# Patient Record
Sex: FEMALE | Race: WHITE | NOT HISPANIC OR LATINO | Employment: OTHER | URBAN - NONMETROPOLITAN AREA
[De-identification: names, ages, dates, MRNs, and addresses within clinical notes are randomized per-mention and may not be internally consistent; named-entity substitution may affect disease eponyms.]

---

## 2021-04-22 ENCOUNTER — TRANSFERRED RECORDS (OUTPATIENT)
Dept: HEALTH INFORMATION MANAGEMENT | Facility: HOSPITAL | Age: 61
End: 2021-04-22

## 2021-05-11 ENCOUNTER — TRANSFERRED RECORDS (OUTPATIENT)
Dept: HEALTH INFORMATION MANAGEMENT | Facility: HOSPITAL | Age: 61
End: 2021-05-11

## 2021-05-19 ENCOUNTER — ANESTHESIA EVENT (OUTPATIENT)
Dept: SURGERY | Facility: HOSPITAL | Age: 61
End: 2021-05-19
Payer: COMMERCIAL

## 2021-05-20 RX ORDER — TRAZODONE HYDROCHLORIDE 100 MG/1
100 TABLET ORAL AT BEDTIME
COMMUNITY

## 2021-05-20 RX ORDER — DOCUSATE SODIUM 100 MG/1
100 CAPSULE, LIQUID FILLED ORAL 2 TIMES DAILY
COMMUNITY

## 2021-05-20 NOTE — ANESTHESIA PREPROCEDURE EVALUATION
Anesthesia Pre-Procedure Evaluation    Patient: Mini Aguilar   MRN: [id number] : 1960        Preoperative Diagnosis: Osteoarthritis of right knee [M17.11]   Procedure : Procedure(s):  RIGHT KNEE TOTAL REPLACEMENT     No past medical history on file.   Past Surgical History:   Procedure Laterality Date     bladder repair       Diabetes       GASTRIC BYPASS       vaginal hysterectomy      arthritis      No Known Allergies   Social History     Tobacco Use     Smoking status: Never Smoker   Substance Use Topics     Alcohol use: Yes     Comment: rarely      Wt Readings from Last 1 Encounters:   No data found for Wt        Anesthesia Evaluation   Pt has had prior anesthetic. Type: General and MAC.    No history of anesthetic complications       ROS/MED HX  ENT/Pulmonary:     (+) allergic rhinitis,     Neurologic: Comment: Insomnia      Cardiovascular:  - neg cardiovascular ROS   (+) -----Previous cardiac testing   Echo: Date: Results:    Stress Test: Date: Results:    ECG Reviewed: Date: 21 Results:  Nsr rate 65  Cath: Date: Results:      METS/Exercise Tolerance: >4 METS    Hematologic:  - neg hematologic  ROS     Musculoskeletal:   (+) arthritis,     GI/Hepatic: Comment: S/p Gastric bypass      Renal/Genitourinary: Comment: Bladder repair      Endo: Comment: Pre diabetes       Psychiatric/Substance Use:  - neg psychiatric ROS     Infectious Disease: Comment: C diff  history      Malignancy:  - neg malignancy ROS     Other:  - neg other ROS          Physical Exam    Airway        Mallampati: I   TM distance: > 3 FB   Neck ROM: full   Mouth opening: > 3 cm    Respiratory Devices and Support         Dental  no notable dental history         Cardiovascular   cardiovascular exam normal       Rhythm and rate: regular and normal     Pulmonary   pulmonary exam normal        breath sounds clear to auscultation           OUTSIDE LABS:  CBC: No results found for: WBC, HGB, HCT, PLT  BMP: No results found  for: NA, POTASSIUM, CHLORIDE, CO2, BUN, CR, GLC  COAGS: No results found for: PTT, INR, FIBR  POC: No results found for: BGM, HCG, HCGS  HEPATIC: No results found for: ALBUMIN, PROTTOTAL, ALT, AST, GGT, ALKPHOS, BILITOTAL, BILIDIRECT, ALEXSANDER  OTHER: No results found for: PH, LACT, A1C, AMANDA, PHOS, MAG, LIPASE, AMYLASE, TSH, T4, T3, CRP, SED    Anesthesia Plan    ASA Status:  2   NPO Status:  NPO Appropriate    Anesthesia Type: Spinal.   Induction: N/a.           Consents    Anesthesia Plan(s) and associated risks, benefits, and realistic alternatives discussed. Questions answered and patient/representative(s) expressed understanding.     - Discussed with:  Patient         Postoperative Care    Pain management: Neuraxial analgesia, Peripheral nerve block (Single Shot).        Comments:     5/11/21  Discussed risks and benefits of spinal anesthetic with patient including itching, sore back, infection, hematoma, spinal headache, CV complications, nerve damage, inability to place, conversion to general anesthesia, loss of airway, and death. Discussed risks and benefits of adductor canal and iPACK on right leg. Pt wishes to proceed.             RAFAEL Rubin CRNA

## 2021-05-24 ENCOUNTER — EXTERNAL ORDER RESULTS (OUTPATIENT)
Dept: TRANSPLANT | Facility: CLINIC | Age: 61
End: 2021-05-24

## 2021-05-26 RX ORDER — MULTIVITAMIN WITH IRON
1 TABLET ORAL DAILY
COMMUNITY

## 2021-05-26 RX ORDER — FOLIC ACID 0.8 MG
500 TABLET ORAL AT BEDTIME
COMMUNITY

## 2021-05-27 ENCOUNTER — APPOINTMENT (OUTPATIENT)
Dept: GENERAL RADIOLOGY | Facility: HOSPITAL | Age: 61
End: 2021-05-27
Attending: ORTHOPAEDIC SURGERY
Payer: COMMERCIAL

## 2021-05-27 ENCOUNTER — ANESTHESIA (OUTPATIENT)
Dept: SURGERY | Facility: HOSPITAL | Age: 61
End: 2021-05-27
Payer: COMMERCIAL

## 2021-05-27 ENCOUNTER — HOSPITAL ENCOUNTER (INPATIENT)
Facility: HOSPITAL | Age: 61
LOS: 1 days | Discharge: HOME | End: 2021-05-29
Attending: ORTHOPAEDIC SURGERY | Admitting: INTERNAL MEDICINE
Payer: COMMERCIAL

## 2021-05-27 DIAGNOSIS — M25.561 CHRONIC PAIN OF RIGHT KNEE: Primary | ICD-10-CM

## 2021-05-27 DIAGNOSIS — G89.29 CHRONIC PAIN OF RIGHT KNEE: Primary | ICD-10-CM

## 2021-05-27 PROCEDURE — 710N000010 HC RECOVERY PHASE 1, LEVEL 2, PER MIN: Performed by: ORTHOPAEDIC SURGERY

## 2021-05-27 PROCEDURE — 64447 NJX AA&/STRD FEMORAL NRV IMG: CPT | Mod: XU | Performed by: NURSE ANESTHETIST, CERTIFIED REGISTERED

## 2021-05-27 PROCEDURE — 88311 DECALCIFY TISSUE: CPT | Mod: TC | Performed by: ORTHOPAEDIC SURGERY

## 2021-05-27 PROCEDURE — 999N000157 HC STATISTIC RCP TIME EA 10 MIN

## 2021-05-27 PROCEDURE — 999N000141 HC STATISTIC PRE-PROCEDURE NURSING ASSESSMENT: Performed by: ORTHOPAEDIC SURGERY

## 2021-05-27 PROCEDURE — 250N000011 HC RX IP 250 OP 636: Performed by: NURSE ANESTHETIST, CERTIFIED REGISTERED

## 2021-05-27 PROCEDURE — 250N000011 HC RX IP 250 OP 636: Performed by: ORTHOPAEDIC SURGERY

## 2021-05-27 PROCEDURE — 76942 ECHO GUIDE FOR BIOPSY: CPT | Mod: 26 | Performed by: NURSE ANESTHETIST, CERTIFIED REGISTERED

## 2021-05-27 PROCEDURE — 258N000003 HC RX IP 258 OP 636: Performed by: NURSE ANESTHETIST, CERTIFIED REGISTERED

## 2021-05-27 PROCEDURE — C1776 JOINT DEVICE (IMPLANTABLE): HCPCS | Performed by: ORTHOPAEDIC SURGERY

## 2021-05-27 PROCEDURE — 88300 SURGICAL PATH GROSS: CPT | Mod: TC | Performed by: ORTHOPAEDIC SURGERY

## 2021-05-27 PROCEDURE — 272N000001 HC OR GENERAL SUPPLY STERILE: Performed by: ORTHOPAEDIC SURGERY

## 2021-05-27 PROCEDURE — 3E0T3BZ INTRODUCTION OF ANESTHETIC AGENT INTO PERIPHERAL NERVES AND PLEXI, PERCUTANEOUS APPROACH: ICD-10-PCS | Performed by: NURSE ANESTHETIST, CERTIFIED REGISTERED

## 2021-05-27 PROCEDURE — 370N000017 HC ANESTHESIA TECHNICAL FEE, PER MIN: Performed by: ORTHOPAEDIC SURGERY

## 2021-05-27 PROCEDURE — 27447 TOTAL KNEE ARTHROPLASTY: CPT | Performed by: NURSE ANESTHETIST, CERTIFIED REGISTERED

## 2021-05-27 PROCEDURE — 250N000013 HC RX MED GY IP 250 OP 250 PS 637: Performed by: ORTHOPAEDIC SURGERY

## 2021-05-27 PROCEDURE — C9290 INJ, BUPIVACAINE LIPOSOME: HCPCS | Performed by: ORTHOPAEDIC SURGERY

## 2021-05-27 PROCEDURE — 250N000009 HC RX 250: Performed by: ORTHOPAEDIC SURGERY

## 2021-05-27 PROCEDURE — 999N000065 XR KNEE PORT RIGHT 1/2 VIEWS: Mod: RT

## 2021-05-27 PROCEDURE — 250N000009 HC RX 250: Performed by: NURSE ANESTHETIST, CERTIFIED REGISTERED

## 2021-05-27 PROCEDURE — 0SRC0J9 REPLACEMENT OF RIGHT KNEE JOINT WITH SYNTHETIC SUBSTITUTE, CEMENTED, OPEN APPROACH: ICD-10-PCS | Performed by: ORTHOPAEDIC SURGERY

## 2021-05-27 PROCEDURE — 360N000077 HC SURGERY LEVEL 4, PER MIN: Performed by: ORTHOPAEDIC SURGERY

## 2021-05-27 PROCEDURE — C1713 ANCHOR/SCREW BN/BN,TIS/BN: HCPCS | Performed by: ORTHOPAEDIC SURGERY

## 2021-05-27 DEVICE — SCREW-FEMALE HEX 2.5MM/25MM LENGTH: Type: IMPLANTABLE DEVICE | Site: KNEE | Status: FUNCTIONAL

## 2021-05-27 DEVICE — IMPLANTABLE DEVICE: Type: IMPLANTABLE DEVICE | Site: KNEE | Status: FUNCTIONAL

## 2021-05-27 DEVICE — BONE CEMENT-SIMPLEX: Type: IMPLANTABLE DEVICE | Site: KNEE | Status: FUNCTIONAL

## 2021-05-27 RX ORDER — BISACODYL 10 MG
10 SUPPOSITORY, RECTAL RECTAL DAILY PRN
Status: DISCONTINUED | OUTPATIENT
Start: 2021-05-27 | End: 2021-05-29 | Stop reason: HOSPADM

## 2021-05-27 RX ORDER — NALOXONE HYDROCHLORIDE 0.4 MG/ML
0.2 INJECTION, SOLUTION INTRAMUSCULAR; INTRAVENOUS; SUBCUTANEOUS
Status: DISCONTINUED | OUTPATIENT
Start: 2021-05-27 | End: 2021-05-29 | Stop reason: HOSPADM

## 2021-05-27 RX ORDER — MULTIVITAMIN WITH IRON
1 TABLET ORAL DAILY
Status: DISCONTINUED | OUTPATIENT
Start: 2021-05-27 | End: 2021-05-27 | Stop reason: RX

## 2021-05-27 RX ORDER — ROPIVACAINE HYDROCHLORIDE 5 MG/ML
INJECTION, SOLUTION EPIDURAL; INFILTRATION; PERINEURAL
Status: DISCONTINUED | OUTPATIENT
Start: 2021-05-27 | End: 2021-05-27

## 2021-05-27 RX ORDER — HYDROXYZINE HYDROCHLORIDE 25 MG/1
25 TABLET, FILM COATED ORAL EVERY 6 HOURS PRN
Status: DISCONTINUED | OUTPATIENT
Start: 2021-05-27 | End: 2021-05-29 | Stop reason: HOSPADM

## 2021-05-27 RX ORDER — NALOXONE HYDROCHLORIDE 0.4 MG/ML
0.2 INJECTION, SOLUTION INTRAMUSCULAR; INTRAVENOUS; SUBCUTANEOUS
Status: DISCONTINUED | OUTPATIENT
Start: 2021-05-27 | End: 2021-05-27

## 2021-05-27 RX ORDER — AMOXICILLIN 250 MG
1 CAPSULE ORAL 2 TIMES DAILY
Status: DISCONTINUED | OUTPATIENT
Start: 2021-05-27 | End: 2021-05-29 | Stop reason: HOSPADM

## 2021-05-27 RX ORDER — SODIUM CHLORIDE, SODIUM LACTATE, POTASSIUM CHLORIDE, CALCIUM CHLORIDE 600; 310; 30; 20 MG/100ML; MG/100ML; MG/100ML; MG/100ML
INJECTION, SOLUTION INTRAVENOUS CONTINUOUS
Status: DISCONTINUED | OUTPATIENT
Start: 2021-05-27 | End: 2021-05-29 | Stop reason: HOSPADM

## 2021-05-27 RX ORDER — OXYCODONE HYDROCHLORIDE 5 MG/1
15 TABLET ORAL EVERY 4 HOURS PRN
Status: DISCONTINUED | OUTPATIENT
Start: 2021-05-27 | End: 2021-05-29 | Stop reason: HOSPADM

## 2021-05-27 RX ORDER — POLYETHYLENE GLYCOL 3350 17 G/17G
17 POWDER, FOR SOLUTION ORAL DAILY
Status: DISCONTINUED | OUTPATIENT
Start: 2021-05-28 | End: 2021-05-29 | Stop reason: HOSPADM

## 2021-05-27 RX ORDER — FENTANYL CITRATE 50 UG/ML
INJECTION, SOLUTION INTRAMUSCULAR; INTRAVENOUS PRN
Status: DISCONTINUED | OUTPATIENT
Start: 2021-05-27 | End: 2021-05-27

## 2021-05-27 RX ORDER — HYDROMORPHONE HCL IN WATER/PF 6 MG/30 ML
0.2 PATIENT CONTROLLED ANALGESIA SYRINGE INTRAVENOUS
Status: DISCONTINUED | OUTPATIENT
Start: 2021-05-27 | End: 2021-05-29 | Stop reason: HOSPADM

## 2021-05-27 RX ORDER — ACETAMINOPHEN 325 MG/1
975 TABLET ORAL ONCE
Status: COMPLETED | OUTPATIENT
Start: 2021-05-27 | End: 2021-05-27

## 2021-05-27 RX ORDER — NALOXONE HYDROCHLORIDE 0.4 MG/ML
0.4 INJECTION, SOLUTION INTRAMUSCULAR; INTRAVENOUS; SUBCUTANEOUS
Status: DISCONTINUED | OUTPATIENT
Start: 2021-05-27 | End: 2021-05-27

## 2021-05-27 RX ORDER — ROPIVACAINE HYDROCHLORIDE 5 MG/ML
INJECTION, SOLUTION EPIDURAL; INFILTRATION; PERINEURAL PRN
Status: DISCONTINUED | OUTPATIENT
Start: 2021-05-27 | End: 2021-05-27

## 2021-05-27 RX ORDER — FENTANYL CITRATE 50 UG/ML
25-50 INJECTION, SOLUTION INTRAMUSCULAR; INTRAVENOUS
Status: DISCONTINUED | OUTPATIENT
Start: 2021-05-27 | End: 2021-05-27 | Stop reason: HOSPADM

## 2021-05-27 RX ORDER — DEXAMETHASONE SODIUM PHOSPHATE 10 MG/ML
INJECTION, SOLUTION INTRAMUSCULAR; INTRAVENOUS PRN
Status: DISCONTINUED | OUTPATIENT
Start: 2021-05-27 | End: 2021-05-27

## 2021-05-27 RX ORDER — TRANEXAMIC ACID 10 MG/ML
1 INJECTION, SOLUTION INTRAVENOUS ONCE
Status: DISCONTINUED | OUTPATIENT
Start: 2021-05-27 | End: 2021-05-27 | Stop reason: HOSPADM

## 2021-05-27 RX ORDER — FOLIC ACID 0.8 MG
500 TABLET ORAL AT BEDTIME
Status: DISCONTINUED | OUTPATIENT
Start: 2021-05-27 | End: 2021-05-27 | Stop reason: CLARIF

## 2021-05-27 RX ORDER — DOCUSATE SODIUM 100 MG/1
100 CAPSULE, LIQUID FILLED ORAL 2 TIMES DAILY
Status: DISCONTINUED | OUTPATIENT
Start: 2021-05-27 | End: 2021-05-29 | Stop reason: HOSPADM

## 2021-05-27 RX ORDER — CEFAZOLIN SODIUM 2 G/100ML
2 INJECTION, SOLUTION INTRAVENOUS
Status: COMPLETED | OUTPATIENT
Start: 2021-05-27 | End: 2021-05-27

## 2021-05-27 RX ORDER — BUPIVACAINE HYDROCHLORIDE 7.5 MG/ML
INJECTION, SOLUTION INTRASPINAL PRN
Status: DISCONTINUED | OUTPATIENT
Start: 2021-05-27 | End: 2021-05-27

## 2021-05-27 RX ORDER — OXYCODONE HYDROCHLORIDE 5 MG/1
5-10 TABLET ORAL
Qty: 30 TABLET | Refills: 0 | Status: SHIPPED | OUTPATIENT
Start: 2021-05-27

## 2021-05-27 RX ORDER — TRANEXAMIC ACID 10 MG/ML
1 INJECTION, SOLUTION INTRAVENOUS ONCE
Status: COMPLETED | OUTPATIENT
Start: 2021-05-27 | End: 2021-05-27

## 2021-05-27 RX ORDER — LIDOCAINE 40 MG/G
CREAM TOPICAL
Status: DISCONTINUED | OUTPATIENT
Start: 2021-05-27 | End: 2021-05-27 | Stop reason: HOSPADM

## 2021-05-27 RX ORDER — MULTIVITAMIN,THERAPEUTIC
1 TABLET ORAL 2 TIMES DAILY
COMMUNITY

## 2021-05-27 RX ORDER — NALOXONE HYDROCHLORIDE 0.4 MG/ML
0.4 INJECTION, SOLUTION INTRAMUSCULAR; INTRAVENOUS; SUBCUTANEOUS
Status: DISCONTINUED | OUTPATIENT
Start: 2021-05-27 | End: 2021-05-29 | Stop reason: HOSPADM

## 2021-05-27 RX ORDER — LIDOCAINE 40 MG/G
CREAM TOPICAL
Status: DISCONTINUED | OUTPATIENT
Start: 2021-05-27 | End: 2021-05-29 | Stop reason: HOSPADM

## 2021-05-27 RX ORDER — PROPOFOL 10 MG/ML
INJECTION, EMULSION INTRAVENOUS CONTINUOUS PRN
Status: DISCONTINUED | OUTPATIENT
Start: 2021-05-27 | End: 2021-05-27

## 2021-05-27 RX ORDER — VITAMIN B COMPLEX
25 TABLET ORAL DAILY
Status: DISCONTINUED | OUTPATIENT
Start: 2021-05-27 | End: 2021-05-29 | Stop reason: HOSPADM

## 2021-05-27 RX ORDER — TRAZODONE HYDROCHLORIDE 100 MG/1
100 TABLET ORAL AT BEDTIME
Status: DISCONTINUED | OUTPATIENT
Start: 2021-05-27 | End: 2021-05-29 | Stop reason: HOSPADM

## 2021-05-27 RX ORDER — DEXAMETHASONE SODIUM PHOSPHATE 10 MG/ML
INJECTION, SOLUTION INTRAMUSCULAR; INTRAVENOUS
Status: DISCONTINUED | OUTPATIENT
Start: 2021-05-27 | End: 2021-05-27

## 2021-05-27 RX ORDER — ASPIRIN 81 MG/1
81 TABLET ORAL 2 TIMES DAILY
Status: DISCONTINUED | OUTPATIENT
Start: 2021-05-27 | End: 2021-05-29 | Stop reason: HOSPADM

## 2021-05-27 RX ORDER — CEFAZOLIN SODIUM 2 G/100ML
2 INJECTION, SOLUTION INTRAVENOUS EVERY 8 HOURS
Status: COMPLETED | OUTPATIENT
Start: 2021-05-27 | End: 2021-05-28

## 2021-05-27 RX ORDER — BUPIVACAINE HYDROCHLORIDE 2.5 MG/ML
INJECTION, SOLUTION INFILTRATION; PERINEURAL PRN
Status: DISCONTINUED | OUTPATIENT
Start: 2021-05-27 | End: 2021-05-27 | Stop reason: HOSPADM

## 2021-05-27 RX ORDER — ONDANSETRON 4 MG/1
4 TABLET, ORALLY DISINTEGRATING ORAL EVERY 6 HOURS PRN
Status: DISCONTINUED | OUTPATIENT
Start: 2021-05-27 | End: 2021-05-29 | Stop reason: HOSPADM

## 2021-05-27 RX ORDER — CEFAZOLIN SODIUM 2 G/100ML
2 INJECTION, SOLUTION INTRAVENOUS SEE ADMIN INSTRUCTIONS
Status: DISCONTINUED | OUTPATIENT
Start: 2021-05-27 | End: 2021-05-27 | Stop reason: HOSPADM

## 2021-05-27 RX ORDER — HYDROMORPHONE HYDROCHLORIDE 1 MG/ML
0.4 INJECTION, SOLUTION INTRAMUSCULAR; INTRAVENOUS; SUBCUTANEOUS
Status: DISCONTINUED | OUTPATIENT
Start: 2021-05-27 | End: 2021-05-29 | Stop reason: HOSPADM

## 2021-05-27 RX ORDER — OXYCODONE HYDROCHLORIDE 5 MG/1
10 TABLET ORAL EVERY 4 HOURS PRN
Status: DISCONTINUED | OUTPATIENT
Start: 2021-05-27 | End: 2021-05-29 | Stop reason: HOSPADM

## 2021-05-27 RX ORDER — PROCHLORPERAZINE MALEATE 5 MG
10 TABLET ORAL EVERY 6 HOURS PRN
Status: DISCONTINUED | OUTPATIENT
Start: 2021-05-27 | End: 2021-05-29 | Stop reason: HOSPADM

## 2021-05-27 RX ORDER — SODIUM CHLORIDE, SODIUM LACTATE, POTASSIUM CHLORIDE, CALCIUM CHLORIDE 600; 310; 30; 20 MG/100ML; MG/100ML; MG/100ML; MG/100ML
INJECTION, SOLUTION INTRAVENOUS CONTINUOUS
Status: DISCONTINUED | OUTPATIENT
Start: 2021-05-27 | End: 2021-05-27 | Stop reason: HOSPADM

## 2021-05-27 RX ORDER — ONDANSETRON 4 MG/1
4 TABLET, ORALLY DISINTEGRATING ORAL EVERY 30 MIN PRN
Status: DISCONTINUED | OUTPATIENT
Start: 2021-05-27 | End: 2021-05-27 | Stop reason: HOSPADM

## 2021-05-27 RX ORDER — ONDANSETRON 2 MG/ML
4 INJECTION INTRAMUSCULAR; INTRAVENOUS EVERY 30 MIN PRN
Status: DISCONTINUED | OUTPATIENT
Start: 2021-05-27 | End: 2021-05-27 | Stop reason: HOSPADM

## 2021-05-27 RX ORDER — ASPIRIN 81 MG/1
81 TABLET ORAL 2 TIMES DAILY
Qty: 60 TABLET | Refills: 0 | Status: SHIPPED | OUTPATIENT
Start: 2021-05-27

## 2021-05-27 RX ORDER — CALCIUM CARBONATE 500(1250)
500 TABLET ORAL 2 TIMES DAILY WITH MEALS
Status: DISCONTINUED | OUTPATIENT
Start: 2021-05-27 | End: 2021-05-27 | Stop reason: CLARIF

## 2021-05-27 RX ORDER — ACETAMINOPHEN 325 MG/1
650 TABLET ORAL EVERY 4 HOURS PRN
Qty: 100 TABLET | Refills: 0 | Status: SHIPPED | OUTPATIENT
Start: 2021-05-27

## 2021-05-27 RX ORDER — DOCUSATE SODIUM 100 MG/1
100 CAPSULE, LIQUID FILLED ORAL 2 TIMES DAILY
Status: DISCONTINUED | OUTPATIENT
Start: 2021-05-27 | End: 2021-05-27

## 2021-05-27 RX ORDER — HYDROXYZINE HYDROCHLORIDE 25 MG/1
25 TABLET, FILM COATED ORAL EVERY 6 HOURS PRN
Qty: 30 TABLET | Refills: 0 | Status: SHIPPED | OUTPATIENT
Start: 2021-05-27

## 2021-05-27 RX ORDER — ONDANSETRON 2 MG/ML
4 INJECTION INTRAMUSCULAR; INTRAVENOUS EVERY 6 HOURS PRN
Status: DISCONTINUED | OUTPATIENT
Start: 2021-05-27 | End: 2021-05-29 | Stop reason: HOSPADM

## 2021-05-27 RX ORDER — AMOXICILLIN 250 MG
1-2 CAPSULE ORAL 2 TIMES DAILY
Qty: 30 TABLET | Refills: 0 | Status: SHIPPED | OUTPATIENT
Start: 2021-05-27

## 2021-05-27 RX ORDER — PROPOFOL 10 MG/ML
INJECTION, EMULSION INTRAVENOUS PRN
Status: DISCONTINUED | OUTPATIENT
Start: 2021-05-27 | End: 2021-05-27

## 2021-05-27 RX ORDER — ACETAMINOPHEN 325 MG/1
650 TABLET ORAL EVERY 4 HOURS PRN
Status: DISCONTINUED | OUTPATIENT
Start: 2021-05-30 | End: 2021-05-29 | Stop reason: HOSPADM

## 2021-05-27 RX ORDER — ACETAMINOPHEN 325 MG/1
975 TABLET ORAL EVERY 8 HOURS
Status: DISCONTINUED | OUTPATIENT
Start: 2021-05-27 | End: 2021-05-29 | Stop reason: HOSPADM

## 2021-05-27 RX ADMIN — CEFAZOLIN SODIUM 2 G: 2 INJECTION, SOLUTION INTRAVENOUS at 16:00

## 2021-05-27 RX ADMIN — ACETAMINOPHEN 975 MG: 325 TABLET, FILM COATED ORAL at 15:59

## 2021-05-27 RX ADMIN — HYDROMORPHONE HYDROCHLORIDE 0.2 MG: 0.2 INJECTION, SOLUTION INTRAMUSCULAR; INTRAVENOUS; SUBCUTANEOUS at 14:42

## 2021-05-27 RX ADMIN — DOCUSATE SODIUM AND SENNOSIDES 1 TABLET: 8.6; 5 TABLET, FILM COATED ORAL at 21:39

## 2021-05-27 RX ADMIN — PROPOFOL 100 MCG/KG/MIN: 10 INJECTION, EMULSION INTRAVENOUS at 09:12

## 2021-05-27 RX ADMIN — TRANEXAMIC ACID 1 G: 10 INJECTION, SOLUTION INTRAVENOUS at 08:57

## 2021-05-27 RX ADMIN — DEXAMETHASONE SODIUM PHOSPHATE 5 MG: 10 INJECTION, SOLUTION INTRAMUSCULAR; INTRAVENOUS at 08:00

## 2021-05-27 RX ADMIN — MIDAZOLAM 1 MG: 1 INJECTION INTRAMUSCULAR; INTRAVENOUS at 08:57

## 2021-05-27 RX ADMIN — SODIUM CHLORIDE, POTASSIUM CHLORIDE, SODIUM LACTATE AND CALCIUM CHLORIDE: 600; 310; 30; 20 INJECTION, SOLUTION INTRAVENOUS at 10:26

## 2021-05-27 RX ADMIN — ROPIVACAINE HYDROCHLORIDE 15 ML: 5 INJECTION, SOLUTION EPIDURAL; INFILTRATION; PERINEURAL at 08:04

## 2021-05-27 RX ADMIN — OXYCODONE HYDROCHLORIDE 10 MG: 5 TABLET ORAL at 12:56

## 2021-05-27 RX ADMIN — FENTANYL CITRATE 50 MCG: 50 INJECTION, SOLUTION INTRAMUSCULAR; INTRAVENOUS at 08:57

## 2021-05-27 RX ADMIN — TRAZODONE HYDROCHLORIDE 100 MG: 100 TABLET ORAL at 21:39

## 2021-05-27 RX ADMIN — CEFAZOLIN SODIUM 2 G: 2 INJECTION, SOLUTION INTRAVENOUS at 08:57

## 2021-05-27 RX ADMIN — OXYCODONE HYDROCHLORIDE 15 MG: 5 TABLET ORAL at 17:51

## 2021-05-27 RX ADMIN — DOCUSATE SODIUM 100 MG: 100 CAPSULE, LIQUID FILLED ORAL at 21:39

## 2021-05-27 RX ADMIN — PROPOFOL 30 MG: 10 INJECTION, EMULSION INTRAVENOUS at 09:12

## 2021-05-27 RX ADMIN — TRANEXAMIC ACID 1 G: 10 INJECTION, SOLUTION INTRAVENOUS at 10:05

## 2021-05-27 RX ADMIN — ASPIRIN 81 MG: 81 TABLET, COATED ORAL at 21:39

## 2021-05-27 RX ADMIN — ROPIVACAINE HYDROCHLORIDE 15 ML: 5 INJECTION, SOLUTION EPIDURAL; INFILTRATION; PERINEURAL at 08:00

## 2021-05-27 RX ADMIN — SODIUM CHLORIDE, POTASSIUM CHLORIDE, SODIUM LACTATE AND CALCIUM CHLORIDE: 600; 310; 30; 20 INJECTION, SOLUTION INTRAVENOUS at 07:45

## 2021-05-27 RX ADMIN — MIDAZOLAM 1 MG: 1 INJECTION INTRAMUSCULAR; INTRAVENOUS at 09:04

## 2021-05-27 RX ADMIN — FENTANYL CITRATE 50 MCG: 50 INJECTION, SOLUTION INTRAMUSCULAR; INTRAVENOUS at 09:04

## 2021-05-27 RX ADMIN — OXYCODONE HYDROCHLORIDE 15 MG: 5 TABLET ORAL at 21:39

## 2021-05-27 RX ADMIN — ACETAMINOPHEN 975 MG: 325 TABLET, FILM COATED ORAL at 08:29

## 2021-05-27 RX ADMIN — DOCUSATE SODIUM 100 MG: 100 CAPSULE, LIQUID FILLED ORAL at 12:57

## 2021-05-27 RX ADMIN — Medication 25 MCG: at 14:42

## 2021-05-27 RX ADMIN — DOCUSATE SODIUM AND SENNOSIDES 1 TABLET: 8.6; 5 TABLET, FILM COATED ORAL at 12:56

## 2021-05-27 RX ADMIN — DEXAMETHASONE SODIUM PHOSPHATE 5 MG: 10 INJECTION, SOLUTION INTRAMUSCULAR; INTRAVENOUS at 08:04

## 2021-05-27 RX ADMIN — BUPIVACAINE HYDROCHLORIDE IN DEXTROSE 1.8 ML: 7.5 INJECTION, SOLUTION SUBARACHNOID at 09:07

## 2021-05-27 ASSESSMENT — MIFFLIN-ST. JEOR: SCORE: 1575.59

## 2021-05-27 NOTE — INTERVAL H&P NOTE
Brief History of Illness:   Mini Aguilar is a 61 year old female who was admitted for right knee pain    H&P reviewed and patient examined with no new updates from prior exam

## 2021-05-27 NOTE — PHARMACY
"The following home medications were NOT continued on inpatient admission per \"Discontinuation of nonessential home medications during hospitalization\" policy: calcium carbonate and Vit B complex with Vit C and magnesium    If a therapeutic holiday is deemed inappropriate per the prescriber, please notify the pharmacist regarding the medication order.    The pharmacist is available to answer any questions and/or concerns the patient may have regarding discontinuation of non-essential medications.    Please ensure that these medications are restarted as needed upon discharge via the medication reconciliation discharge process and included on the discharge medication reconciliation report.    Thank you,  Yue De Paz, [facility]    "

## 2021-05-27 NOTE — ANESTHESIA CARE TRANSFER NOTE
Patient: Mini Aguilar    Procedure(s):  RIGHT KNEE TOTAL REPLACEMENT    Diagnosis: Osteoarthritis of right knee [M17.11]  Diagnosis Additional Information: No value filed.    Anesthesia Type:   Spinal     Note:    Oropharynx: spontaneously breathing  Level of Consciousness: awake and drowsy  Oxygen Supplementation: nasal cannula    Independent Airway: airway patency satisfactory and stable  Dentition: dentition unchanged  Vital Signs Stable: post-procedure vital signs reviewed and stable  Report to RN Given: handoff report given  Patient transferred to: PACU    Handoff Report: Identifed the Patient, Identified the Reponsible Provider, Reviewed the pertinent medical history, Discussed the surgical course, Reviewed Intra-OP anesthesia mangement and issues during anesthesia, Set expectations for post-procedure period and Allowed opportunity for questions and acknowledgement of understanding      Vitals: (Last set prior to Anesthesia Care Transfer)  CRNA VITALS  5/27/2021 0958 - 5/27/2021 1033      5/27/2021             Resp Rate (set):  8        Electronically Signed By: RAFAEL Palacio CRNA  May 27, 2021  10:33 AM

## 2021-05-27 NOTE — ANESTHESIA PROCEDURE NOTES
Lumbar puncture Procedure Note  Pre-Procedure   Staff -        CRNA: Kareem Madera APRN CRNA       Performed By: CRNA       Referred By: Dr. Rene       Location: OR       Procedure Start/Stop Times: 5/27/2021 9:02 AM and 5/27/2021 9:11 AM       Pre-Anesthestic Checklist: patient identified, IV checked, risks and benefits discussed, informed consent, monitors and equipment checked, pre-op evaluation, at physician/surgeon's request and post-op pain management  Timeout:       Correct Patient: Yes        Correct Procedure: Yes        Correct Site: Yes        Correct Position: Yes   Procedure Documentation  Procedure: lumbar puncture       Patient Position: sitting       Skin prep: Betadine       Insertion Site: L4-5. (midline approach).       Needle Gauge: 25.        Needle Length (Inches): 3        Spinal Needle Type: Jayne tip       Introducer used       Introducer: 20 G       # of attempts: 1 and  # of redirects:  0    Assessment/Narrative         Paresthesias: No.       CSF fluid: clear.

## 2021-05-27 NOTE — PLAN OF CARE
Prior to Admission Medication Reconciliation:     Medications added:   [x] None  [] As listed below:    Medications deleted:   [x] None  [] As listed below:    Medications marked for review/removal by attending:  [x] None  [] As listed below:    Changes made to existing medications:   [] None  [x] As listed below:    opti source chewable multivitamin QID to a regular multivitamin BID per pt report    Trazodone from 50 mg to 100 mg (pt confirmed)    Calcium from once daily to BID    Last times/dates taken verified with patient:  [x] Yes- completed myself  [] Prepared PTA medlist for review only. (will not be available to review personally)  [] Did not review with patient. Rx verification only. Review completed by nursing.    [] Nurse completed no changes made (double checked entries)  [] Unable to review with patient at this time:  [] Nurse completed/changes made:     Allergies listed at another location:  []Not applicable   []See below:    Allergy review:    [x]Did not review: reviewed by nursing  []Did not review: pt unable at this time  []Patient/MAR verified NKDA  []Patient/MAR verified current existing allergies: no changes made    Medication reconciliation sources:   [x]Patient  []Patient family member/emergency contact: **  [x][facility] Report Review: 2018  [x]Epic Chart Review  [x]Care Everywhere review  []Pharmacy med list: **  []Pharmacy phone call  [x]Outside meds dispense report: see below  []Nursing home or Assisted Living MAR:  []Other: **    Pharmacy desired at discharge: [facility]    Is patient on coumadin?  [x]No    Requests for consultation by provider or pharmacist:   [x] Patient understands why all of their meds were prescribed and how to take them. No questions.   [] Managing party has no questions.   [] Patient/ managing party has questions about the following:  [] Did not review with patient. Cannot assess.     Fill dates and reported compliancy:  [x] Fill dates coincide with compliancy for  all/most maintenance meds.   [] Fill dates do not coincide with compliancy with maintenance meds. See notes in PTA medlist and in comments.    [] Fill dates do not coincide with the following medications but pt reports compliancy:  [] Did not review with patient. Cannot assess.     Historian accuracy:  [x] Excellent- alert and oriented, understands why meds were prescribed and how to take, able to answer specifics  [] Good- alert and oriented, understands why meds were prescribed and how to take, some confusion   [] Fair- alert and oriented, doesn't know medications without list, cannot answer specifics about medications, but has a decent process for which to take at home  [] Poor- does not know medications, may not have a process to take at home, may be cognitively unable to review at this time  []Medication management done by family member or facility, no concerns about historian accuracy.   [] Did not review with patient. Cannot assess.     Medication Management:  [x] Manages meds independently  [] Family member/ other party manages meds:  [] Meds managed by staff at facility  [] Meds set up by home care, family/other party helps administer  [] Meds set up by home care, self administers  [] Did not review with patient. Cannot assess.     Other medications aside from PTA:  [x] Denies taking any medications aside from those listed in PTA meds  [] Reports taking another medication(s) but cannot recall the name(s)  [] Refuses to say.  [] Did not review with patient. Cannot assess.     Comments: none.     Tata Tinajero on 5/27/2021 at 12:18 PM       Discrepancies: [x] No []Not Applicable []Yes: listed below    Time spent on medication reconciliation:   [x]5-20 mins  []20-40 mins  []> 40 mins    Issues completing PTA medication reconciliation:  [] On hold for a long time  [] Waited for a call back  [] Fax didn't come through  [] Fax took a long time  [] Other:    Notifying appropriate party of  changes/additions/discrepancies:  [x]No pertinent changes made, notification not necessary.   [] Notified attending provider via text page/phone call  [] Notified attending provider in person  [] Notified pharmacy  [] Notified nurse  [] Attending provider not available, left detailed notes  [] Pt is not admitted to floor yet, PTA meds completed before admission.   Medications Prior to Admission   Medication Sig Dispense Refill Last Dose     calcium carbonate (OS-AMANDA 500 MG [place]. [place]) 500 MG tablet Take 1 tablet by mouth 2 times daily    Past Month at Unknown time     cholecalciferol (VITAMIN D3) 1000 UNIT tablet Take 1,000 Units by mouth daily    Past Month at am     docusate sodium (COLACE) 100 MG capsule Take 100 mg by mouth 2 times daily   5/26/2021 at hs     Magnesium 500 MG CAPS Take 500 mg by mouth At Bedtime    Past Month at HS     multivitamin, therapeutic (THERA-VIT) TABS tablet Take 1 tablet by mouth 2 times daily   Past Month at Unknown time     traZODone (DESYREL) 100 MG tablet Take 100 mg by mouth At Bedtime    5/26/2021 at hs     vitamin (B COMPLEX-C) tablet Take 1 tablet by mouth daily   Past Month at am       Medication Dispense History (from 5/27/2020 to 5/27/2021)  Expand All  Collapse All  Ciprofloxacin HCl   Dispensed Days Supply Quantity Provider Pharmacy   CIPROFLOXACN TAB 250MG 05/13/2021 5 10 each [facility]         Nitrofurantoin Monohyd Macro   Dispensed Days Supply Quantity Provider Pharmacy   NITROFURANTN CAP 100MG 05/18/2021 5 10 each [facility]         traZODone HCl   Dispensed Days Supply Quantity Provider Pharmacy   TRAZODONE    TAB 100MG 04/01/2021 90 90 each [facility]   TRAZODONE    TAB 100MG 12/29/2020 90 90 each [facility]   TRAZODONE    TAB 100MG 09/16/2020 90 90 each [facility]   TRAZODONE    TAB 50MG 08/14/2020 30 30 each  [facility]   TRAZODONE    TAB 50MG 06/30/2020 30 30 each [facility]   TRAZODONE    TAB 50MG 06/01/2020 30 30 each [facility]

## 2021-05-27 NOTE — OR NURSING
Face to face report given with opportunity to observe patient.    Report given to Rajni ALEJO.    Vaishali Buchanan RN   5/27/2021  11:44 AM

## 2021-05-27 NOTE — PLAN OF CARE
[facility] Inpatient Admission Note:    Patient admitted to 3224/3224-1 at approximately 1130 via bed accompanied by spouse from surgery . Report received from SAPNA Tom in SBAR format at 1130 via face to face in room.  Patient is alert and oriented X 3, denies pain; rates at 0 on 0-10 scale.  Patient oriented to room, unit, hourly rounding, and plan of care. Explained admission packet and patient handbook with patient bill of rights brochure. Will continue to monitor and document as needed.     Inpatient Nursing criteria listed below was met:    Health care directives status obtained and documented: Yes    Care Everywhere authorization obtained NA    MRSA swab completed for patient 65 years and older: N/A    Patient identifies a surrogate decision maker: Yes If yes, who: Lv (spouse) Contact Information: see facesheet     If initial lactic acid >2.0, repeat lactic acid drawn within one hour of arrival to unit: NA. If no, state reason: NA    Vaccination assessment and education completed: Yes   Vaccinations received prior to admission: Pneumovax no  Influenza(seasonal)  YES   Vaccination(s) ordered: patient declines    Clergy visit ordered if patient requests: N/A    Skin issues/needs documented: Yes    Isolation Patient: no Education given, correct sign in place and documentation row added to PCS:  NA    Fall Prevention Yes: Care plan updated, education given and documented, sticker and magnet in place: Yes    Care Plan initiated: Yes    Education Documented (including assessment): Yes    Patient has discharge needs : No If yes, please explain: NA

## 2021-05-27 NOTE — ANESTHESIA POSTPROCEDURE EVALUATION
Patient: Mini Aguilar    Procedure(s):  RIGHT KNEE TOTAL REPLACEMENT    Diagnosis:Osteoarthritis of right knee [M17.11]  Diagnosis Additional Information: No value filed.    Anesthesia Type:  Spinal    Note:  Disposition: Inpatient   Postop Pain Control: Uneventful            Sign Out: Well controlled pain   PONV: No   Neuro/Psych: Uneventful            Sign Out: Acceptable/Baseline neuro status   Airway/Respiratory: Uneventful            Sign Out: Acceptable/Baseline resp. status   CV/Hemodynamics: Uneventful            Sign Out: Acceptable CV status; No obvious hypovolemia; No obvious fluid overload   Other NRE: NONE   DID A NON-ROUTINE EVENT OCCUR? No           Last vitals:  Vitals:    05/27/21 1200 05/27/21 1225 05/27/21 1251   BP: 136/84  137/81   Pulse: 72  67   Resp: 14  16   Temp: 97.6  F (36.4  C)  98.1  F (36.7  C)   SpO2: 95% 97% 97%       Last vitals prior to Anesthesia Care Transfer:  CRNA VITALS  5/27/2021 0958 - 5/27/2021 1058      5/27/2021             Resp Rate (set):  8          Electronically Signed By: RAFAEL Rubin CRNA  May 27, 2021  1:19 PM

## 2021-05-27 NOTE — OP NOTE
Procedure Date: 05/27/2021    PREOPERATIVE DIAGNOSIS:  Right knee degenerative arthrosis.    POSTOPERATIVE DIAGNOSIS:  Right knee degenerative arthrosis.    PROCEDURES:  Right total knee replacement, Richie Persona posterior stabilizer.      ASSISTANT:  Lance Rodriguez PA-C.     ANESTHESIA:  Spinal with block.      COMPLICATIONS:  Without.      IMPLANTS:    1.  Size 9 narrow femoral component.    2.  F base plate.  3.  11 PS poly.  4.  35 patella.    INDICATIONS FOR PROCEDURE:  This man is a 61-year-old with history of right knee pain, progressive in nature, unresponsive to conservative measures.  Recommendation was for total joint reconstruction.  The patient did elect to proceed following a discussion of the risks and benefits.    OPERATIVE PROCEDURE:  The patient was taken out of seat administered spinal.   Following spinal dearterialization right lower extremity prepped and draped in normal sterile fashion.  Preoperative antibiotics administered and timeout was called.  At this point in time a midline incision made, dissection carried down to extensor mechanism.  Medial parapatellar approach was then performed and lateral menisci removed.  Proper retractors were placed above the knee, anterior start point was identified.  Drill was used to gain access distal valgus cut was set at 5 degrees and then a 4 cut block was applied for the distal cut surface.  We sized it up for size 9.  After femoral preparation extramedullary guide was utilized for the tibia, positioned in proper varus and valgus as well as anterior and posterior slope.  After that was complete, we then checked our gap balance, had excellent gap balance with a 10 mm block.  At this point in time.  We then sized the patient's tibia for a size F, centered this over the medial 1/3 of the tubercle, secured this down used the drill, followed by the keel punch and medialized the femoral component, cut our box for the PS. Trialed a 10 and an 11 PS poly and 11  gave us our best range of motion, flexibility and ligamentous support at this time.  Following that, we then resurfaced our patella and drilled for a 35 mm component.  All trial components were removed.  Final meniscal resections were carried out.  Exparel injected in the posterior capsule, medial and lateral gutters as well as anteriorly copiously irrigated the knee itself.  Mixed cement, applied the cement to the tibia following implantation of tibial component cement application femur implantation of femoral component, implantation of polyethylene and cement application backside of the patella and implantation of patellar component.  Once the cement had cured, we copiously irrigated the knee itself, closed our retinacular incision with Vicryl, 2-0 Vicryl, skin staples, Dermabond and Aquacel dressing.  The patient then was transferred to recovery room in stable condition having tolerated the procedure.    POSTOPERATIVE PLAN:  Staples out at 2 weeks out, [facility] with partner.  Follow up with myself in 4-6 weeks ago, BERTHA Aceves.  Therapy has already been arranged for patient [facility] on Tuesday.  Aspirin for DVT prophylaxis.  Oxycodone for pain management as well.  All scripts have been written.    Carlos Rene MD        D: 2021   T: 2021   MT: elías    Name:     SUMA BANERJEEKaterine  MRN:      [id number]        Account:        [id number]   :      1960           Procedure Date: 2021     Document: [id number]

## 2021-05-27 NOTE — PROGRESS NOTES
[facility]    Hospitalist Progress Note    Date of Service (when I saw the patient): 05/27/2021    Assessment & Plan   Mini Aguilar is a 61 year old female who was admitted on 5/27/2021.    Postoperative day #0 status post elective total right knee arthroplasty: Patient doing well.  Operative pain control.  -Postoperative cares and protocol per orthopedics    Patient has no other significant chronic medical conditions requiring prescription medications.    DVT Prophylaxis: Defer to primary service    Code Status: Full Code    Disposition: Expected discharge in 1 to 2 days depending on postop course.    Del Barrios MD      Interval History   Patient seen at bedside.  Operative pain controlled.  Patient denies chest pain, shortness of breath, abdominal pain, nausea.    -Data reviewed today: I reviewed all new labs and imaging results over the last 24 hours. I personally reviewed imaging reports.    Physical Exam   Temp: 97.6  F (36.4  C) Temp src: Tympanic BP: 136/84 Pulse: 72   Resp: 14 SpO2: 97 % O2 Device: None (Room air) Oxygen Delivery: 2 LPM  Vitals:    05/27/21 0718   Weight: 92.5 kg (204 lb)     Vital Signs with Ranges  Temp:  [96.9  F (36.1  C)-97.6  F (36.4  C)] 97.6  F (36.4  C)  Pulse:  [60-72] 72  Resp:  [12-19] 14  BP: (122-144)/() 136/84  SpO2:  [94 %-100 %] 97 %    Intake/Output Summary (Last 24 hours) at 5/27/2021 1255  Last data filed at 5/27/2021 1032  Gross per 24 hour   Intake 1100 ml   Output --   Net 1100 ml       Peripheral IV 05/27/21 Left Hand (Active)   Site Assessment WDL 05/27/21 1140   Line Status Infusing 05/27/21 1140   Dressing Intervention New dressing  05/27/21 0821   Phlebitis Scale 0-->no symptoms 05/27/21 1140   Infiltration Scale 0 05/27/21 1140   Infiltration Site Treatment Method  None 05/27/21 1125   If infiltrated, was a Vesicant infusing? No 05/27/21 0821   Number of days: 0       Incision/Surgical Site 05/27/21 Right Knee (Active)   Incision  Assessment UTV 05/27/21 1125   Closure KELTON 05/27/21 1125   Incision Drainage Amount UTV 05/27/21 1125   Dressing Intervention Clean, dry, intact 05/27/21 1125   Number of days: 0     No line/device    Constitutional - AA, NAD  HEENT - atraumatic, normocephalic  Neck - supple, no masses, no JVD  CVS - S1 S2 RRR, no murmurs, rubs, gallops  Respiratory - CTA b/l  GI - soft, NT, ND, + bowel sounds, no organomegaly  Musculoskeletal - no LE edema, no lesions  Neuro - oriented x 3, no gross focal deficits    Medications     lactated ringers 75 mL/hr at 05/27/21 1202       acetaminophen  975 mg Oral Q8H     aspirin  81 mg Oral BID     calcium carbonate  500 mg Oral BID w/meals     ceFAZolin  2 g Intravenous Q8H     docusate sodium  100 mg Oral BID     Magnesium  500 mg Oral At Bedtime     [START ON 5/28/2021] polyethylene glycol  17 g Oral Daily     senna-docusate  1 tablet Oral BID     sodium chloride (PF)  3 mL Intracatheter Q8H     traZODone  100 mg Oral At Bedtime     vitamin  1 tablet Oral Daily     vitamin D3  25 mcg Oral Daily       Data   No lab results found in last 7 days.       Recent Results (from the past 24 hour(s))   XR Knee Port Right 1/2 Views    Narrative    XR KNEE PORT RIGHT 1/2 VIEWS    HISTORY: 61 years Female Post-Op Total Knee    COMPARISON: None    TECHNIQUE: 2 views right knee    FINDINGS: Patient status post right total knee arthroplasty. Skin  closure staples are present. Alignment is anatomic.      Impression    IMPRESSION: Right total knee arthroplasty without evidence of  complication.    MD Del ALLEN MD

## 2021-05-27 NOTE — ANESTHESIA PROCEDURE NOTES
Saphenous Procedure Note  Pre-Procedure   Staff -        CRNA: Kareem Madera APRN CRNA       Performed By: CRNA       Referred By:        Location: pre-op       Procedure Start/Stop Times: 5/27/2021 7:57 AM and 5/27/2021 8:11 AM       Pre-Anesthestic Checklist: patient identified, IV checked, risks and benefits discussed, informed consent, monitors and equipment checked, pre-op evaluation, at physician/surgeon's request and post-op pain management  Timeout:       Correct Patient: Yes        Correct Procedure: Yes        Correct Site: Yes        Correct Position: Yes        Correct Laterality: Yes        Site Marked: N/A  Procedure Documentation  Procedure: Saphenous       Diagnosis: SAPHENOUS POPLITEAL BLOCK       Laterality: right       Patient Position: sitting       Skin prep: Chloraprep       Needle Type: insulated       Needle Gauge: 22.        Needle Length (Inches): 4        2. Ultrasound was used to visualize the spread of anesthetic in close proximity to the above referenced structure.    Assessment/Narrative         The placement was negative for: blood aspirated, painful injection and site bleeding       Paresthesias: No.      Bolus given via needle. No blood aspirated via catheter.        Secured via.        Insertion/Infusion Method: Single Shot       Complications: none

## 2021-05-28 ENCOUNTER — APPOINTMENT (OUTPATIENT)
Dept: PHYSICAL THERAPY | Facility: HOSPITAL | Age: 61
End: 2021-05-28
Attending: ORTHOPAEDIC SURGERY
Payer: COMMERCIAL

## 2021-05-28 PROBLEM — M25.569 KNEE PAIN, ACUTE: Status: ACTIVE | Noted: 2021-05-28

## 2021-05-28 LAB
COPATH REPORT: NORMAL
HGB BLD-MCNC: 10.8 G/DL (ref 11.7–15.7)

## 2021-05-28 PROCEDURE — 85018 HEMOGLOBIN: CPT | Performed by: ORTHOPAEDIC SURGERY

## 2021-05-28 PROCEDURE — 250N000013 HC RX MED GY IP 250 OP 250 PS 637: Performed by: ORTHOPAEDIC SURGERY

## 2021-05-28 PROCEDURE — 97161 PT EVAL LOW COMPLEX 20 MIN: CPT | Mod: GP

## 2021-05-28 PROCEDURE — 250N000011 HC RX IP 250 OP 636: Performed by: INTERNAL MEDICINE

## 2021-05-28 PROCEDURE — 250N000011 HC RX IP 250 OP 636: Performed by: ORTHOPAEDIC SURGERY

## 2021-05-28 PROCEDURE — 97530 THERAPEUTIC ACTIVITIES: CPT | Mod: GP

## 2021-05-28 PROCEDURE — 120N000001 HC R&B MED SURG/OB

## 2021-05-28 PROCEDURE — 97110 THERAPEUTIC EXERCISES: CPT | Mod: GP

## 2021-05-28 PROCEDURE — 36415 COLL VENOUS BLD VENIPUNCTURE: CPT | Performed by: ORTHOPAEDIC SURGERY

## 2021-05-28 RX ORDER — HYDROMORPHONE HCL IN WATER/PF 6 MG/30 ML
0.2 PATIENT CONTROLLED ANALGESIA SYRINGE INTRAVENOUS ONCE
Status: COMPLETED | OUTPATIENT
Start: 2021-05-28 | End: 2021-05-28

## 2021-05-28 RX ADMIN — OXYCODONE HYDROCHLORIDE 15 MG: 5 TABLET ORAL at 20:12

## 2021-05-28 RX ADMIN — HYDROMORPHONE HYDROCHLORIDE 0.4 MG: 1 INJECTION, SOLUTION INTRAMUSCULAR; INTRAVENOUS; SUBCUTANEOUS at 22:24

## 2021-05-28 RX ADMIN — HYDROXYZINE HYDROCHLORIDE 25 MG: 25 TABLET, FILM COATED ORAL at 16:23

## 2021-05-28 RX ADMIN — OXYCODONE HYDROCHLORIDE 10 MG: 5 TABLET ORAL at 01:54

## 2021-05-28 RX ADMIN — DOCUSATE SODIUM AND SENNOSIDES 1 TABLET: 8.6; 5 TABLET, FILM COATED ORAL at 08:38

## 2021-05-28 RX ADMIN — OXYCODONE HYDROCHLORIDE 10 MG: 5 TABLET ORAL at 07:02

## 2021-05-28 RX ADMIN — DOCUSATE SODIUM 100 MG: 100 CAPSULE, LIQUID FILLED ORAL at 08:38

## 2021-05-28 RX ADMIN — DOCUSATE SODIUM 100 MG: 100 CAPSULE, LIQUID FILLED ORAL at 20:12

## 2021-05-28 RX ADMIN — HYDROMORPHONE HYDROCHLORIDE 0.4 MG: 1 INJECTION, SOLUTION INTRAMUSCULAR; INTRAVENOUS; SUBCUTANEOUS at 16:25

## 2021-05-28 RX ADMIN — ACETAMINOPHEN 975 MG: 325 TABLET, FILM COATED ORAL at 00:10

## 2021-05-28 RX ADMIN — HYDROMORPHONE HYDROCHLORIDE 0.2 MG: 0.2 INJECTION, SOLUTION INTRAMUSCULAR; INTRAVENOUS; SUBCUTANEOUS at 03:12

## 2021-05-28 RX ADMIN — OXYCODONE HYDROCHLORIDE 10 MG: 5 TABLET ORAL at 11:11

## 2021-05-28 RX ADMIN — HYDROMORPHONE HYDROCHLORIDE 0.4 MG: 1 INJECTION, SOLUTION INTRAMUSCULAR; INTRAVENOUS; SUBCUTANEOUS at 20:17

## 2021-05-28 RX ADMIN — HYDROMORPHONE HYDROCHLORIDE 0.2 MG: 0.2 INJECTION, SOLUTION INTRAMUSCULAR; INTRAVENOUS; SUBCUTANEOUS at 08:34

## 2021-05-28 RX ADMIN — Medication 25 MCG: at 08:38

## 2021-05-28 RX ADMIN — ASPIRIN 81 MG: 81 TABLET, COATED ORAL at 20:12

## 2021-05-28 RX ADMIN — CEFAZOLIN SODIUM 2 G: 2 INJECTION, SOLUTION INTRAVENOUS at 01:10

## 2021-05-28 RX ADMIN — POLYETHYLENE GLYCOL 3350 17 G: 17 POWDER, FOR SOLUTION ORAL at 08:38

## 2021-05-28 RX ADMIN — HYDROMORPHONE HYDROCHLORIDE 0.2 MG: 0.2 INJECTION, SOLUTION INTRAMUSCULAR; INTRAVENOUS; SUBCUTANEOUS at 11:40

## 2021-05-28 RX ADMIN — ACETAMINOPHEN 975 MG: 325 TABLET, FILM COATED ORAL at 08:37

## 2021-05-28 RX ADMIN — ACETAMINOPHEN 975 MG: 325 TABLET, FILM COATED ORAL at 16:16

## 2021-05-28 RX ADMIN — HYDROMORPHONE HYDROCHLORIDE 0.4 MG: 1 INJECTION, SOLUTION INTRAMUSCULAR; INTRAVENOUS; SUBCUTANEOUS at 18:43

## 2021-05-28 RX ADMIN — ASPIRIN 81 MG: 81 TABLET, COATED ORAL at 08:38

## 2021-05-28 RX ADMIN — OXYCODONE HYDROCHLORIDE 15 MG: 5 TABLET ORAL at 15:10

## 2021-05-28 RX ADMIN — DOCUSATE SODIUM AND SENNOSIDES 1 TABLET: 8.6; 5 TABLET, FILM COATED ORAL at 20:12

## 2021-05-28 RX ADMIN — TRAZODONE HYDROCHLORIDE 100 MG: 100 TABLET ORAL at 20:13

## 2021-05-28 ASSESSMENT — ACTIVITIES OF DAILY LIVING (ADL)
ADLS_ACUITY_SCORE: 17
ADLS_ACUITY_SCORE: 17

## 2021-05-28 NOTE — UTILIZATION REVIEW
Admission Status; Secondary Review Determination       Under the authority of the Utilization Management Committee, the utilization review process indicated a secondary review on the above patient. The review outcome is based on review of the medical records, discussions with staff, and applying clinical experience noted on the date of the review.     (x) Outpatient Status with extended recovery is appropriate - This patient does not meet hospital inpatient criteria. If this patient's primary payer is Medicare and was admitted as an inpatient, Condition Code 44 should be used and patient status changed to outpatient recovery.    RATIONALE FOR DETERMINATION   . Patient was admitted to the hospital after the procedure. Patient has Medicare and the procedure is not on the CMS inpatient list. No documented complications or unexpected recovery. Patient can be safely  monitored for bleeding and recover in outpatient/extended recovery setting.   The severity of illness, intensity of service provided, expected LOS and risk for adverse outcome doesn't meet inpatient hospital admission.     The patient is a 61-year-old female admitted on 5/27/2021.  She is postop day #1 from a elective total right knee arthroplasty.  She had an uncomplicated surgery but has had persisting pain that has been difficult to control.  The case was discussed with Dr. Mathis .  If she is discharged today, the patient will remain as outpatient status.  If the patient requires an additional midnight stay due to poorly controlled pain, the patient will be advanced to inpatient status.    The information on this document is developed by the utilization review team in order for the business office to ensure compliance. This only denotes the appropriateness of proper admission status and does not reflect the quality of care rendered.   The definitions of Inpatient Status and Observation Status used in making the determination above are those provided in  the CMS Coverage Manual, Chapter 1 and Chapter 6, section 70.4.     Sincerely,   Garrett Villanueva MD  Physician Advisor  Utilization Review/ Case Management  [facility].

## 2021-05-28 NOTE — PLAN OF CARE
"Reason for hospital stay:  Right Total Knee Arthroplasty POD #0    Most recent vitals: /66   Pulse 68   Temp 97.8  F (36.6  C) (Tympanic)   Resp 20   Ht 1.778 m (5' 10\")   Wt 92.5 kg (204 lb)   SpO2 94%   BMI 29.27 kg/m      Pain Management:  Complained of right knee surgical pain. Gave PO oxy, and tylenol with effective pain relief. Also using cryocuff to rt knee entire shift.     LOC:  A+O x4 - calls appropriately and makes needs known.    Cardiac:  WDL    Respiratory:  Lungs clear throughout. Sats 94% on RA.     GI:  WDL    :  WDL - denies any issues voiding    Skin Issues:  Right knee dressing remains clean, dry, and intact. CMS remains intact to RLE.     IVF:  LR infusing at 75mL/hr    ABX:  Continues on IV Ancef    Activity: Standby assist with gait belt and walker. Ambulates to bathroom and ambulated in hallway this evening. Up in chair for supper.       Face to face report given with opportunity to observe patient.    Report given to Flor Gamez RN   5/27/2021  11:07 PM      "

## 2021-05-28 NOTE — PLAN OF CARE
"Most recent vitals: /65   Pulse 75   Temp 98.3  F (36.8  C) (Tympanic)   Resp 17   Ht 1.778 m (5' 10\")   Wt 92.5 kg (204 lb)   SpO2 95%   BMI 29.27 kg/m       A&O, makes needs known, uses call light appropriately, up to bathroom to void, assist x1 with gb/walker. Afebrile. Reports pain in right knee, 9-10, given oxycodone x2, IV dilaudid x1 this shift, patient reported decrease in pain. Lungs clear, oxygen 95%. Patient taking in PO, tolerating well. IV saline locked. Dressing to right knee, clean, dry, and intact, ace wrap in place, CMS intact. Patient slept part of this shift. Call light and personal items within reach.    Face to face report given with opportunity to observe patient.    Report given to SAPNA Farah RN   5/28/2021  7:28 AM      "

## 2021-05-28 NOTE — PROGRESS NOTES
Inpatient Physical Therapy Evaluation    Name: Mini Aguilar MRN# [id number]   Age: 61 year old YOB: 1960     Date of Consultation: May 28, 2021  Consultation is requested by:  Dr. Rene   Specific Consultation Request:  Status post knee surgery  Primary care provider: Robson Varela    General Information:   Onset Date: 21    History of Current Problem/Admission: Osteoarthritis of right knee [M17.11]  Right knee pain [M25.561]    Prior Level of Function: Ambulated without assisted device and was community ambulator prior. Has PMH of L TKA and bilateral CASIE. Has walker at home and is planning to have  and daughter available to assist her in the next several days.   Ambulation: 0 - Independent   Transferrin - Independent   Toiletin - Independent    Bathin - Independent   Dressin - Independent   Eatin - Not assessed  Communication: 0 - Understands/communicates without difficulty  Cognition: 0 - no cognitive issues reported      Current Living Situation: Patient has 5 stairs to enter the home. Patient has a railing on both sides, but wide apart so plans to use SPC and one handrail. Once in home no stairs. Has FWW and SPC at home.      Current Equipment Used at Home: Usually none, but has FWW and SPC.      Patient & Family Goals: good TKA recoveryn     Past Medical History:   History reviewed. No pertinent past medical history.    Past Surgical History:  Past Surgical History:   Procedure Laterality Date     bladder repair       Diabetes       GASTRIC BYPASS       vaginal hysterectomy      arthritis       Medications:   Current Facility-Administered Medications   Medication     [START ON 2021] acetaminophen (TYLENOL) tablet 650 mg     acetaminophen (TYLENOL) tablet 975 mg     aspirin EC tablet 81 mg     benzocaine-menthol (CEPACOL) 15-3.6 MG lozenge 1 lozenge     bisacodyl (DULCOLAX) Suppository 10 mg     docusate sodium (COLACE) capsule 100 mg      HYDROmorphone (DILAUDID) injection 0.2 mg    Or     HYDROmorphone (PF) (DILAUDID) injection 0.4 mg     hydrOXYzine (ATARAX) tablet 25 mg     lactated ringers infusion     lidocaine (LMX4) kit     lidocaine 1 % 0.1-1 mL     magnesium hydroxide (MILK OF MAGNESIA) suspension 30 mL     naloxone (NARCAN) injection 0.2 mg    Or     naloxone (NARCAN) injection 0.4 mg    Or     naloxone (NARCAN) injection 0.2 mg    Or     naloxone (NARCAN) injection 0.4 mg     ondansetron (ZOFRAN-ODT) ODT tab 4 mg    Or     ondansetron (ZOFRAN) injection 4 mg     oxyCODONE (ROXICODONE) tablet 10 mg    Or     oxyCODONE (ROXICODONE) tablet 15 mg     polyethylene glycol (MIRALAX) Packet 17 g     prochlorperazine (COMPAZINE) injection 10 mg    Or     prochlorperazine (COMPAZINE) tablet 10 mg     senna-docusate (SENOKOT-S/PERICOLACE) 8.6-50 MG per tablet 1 tablet     sodium chloride (PF) 0.9% PF flush 3 mL     sodium chloride (PF) 0.9% PF flush 3 mL     sodium phosphate (FLEET ENEMA) 1 enema     traZODone (DESYREL) tablet 100 mg     Vitamin D3 (CHOLECALCIFEROL) tablet 25 mcg       Weight Bearing Status: WBAT    Cognitive Status Examination:  Orientation: awake and alert  Level of Consciousness: alert  Follows Commands and Answers Questions: 100% of the time  Personal Safety and Judgement: Intact  Memory: Immediate recall intact    Pain:   Currently in pain? Yes  Pain Location? right knee  Pain Ratin    Physical Therapy Evaluation:   Integumentary/Edema: bandage in place   ROM: 0-0-75 degrees. Pain and guarding limit flexion  Strength: Good quad set, HS set and able to perform independent SLR. Patient has good functional strength in bilateral UEs and L LE.   Bed Mobility: Min A to R LE for sit to supine transfer, able to scoot and bridge independently.   Transfers: Patient performed sit to stand transfers SBA   Gait: Patient ambulated 250 feet with FWW CGA with step through pattern and improved mechanics with cuing. Fatiguing and painful for  patient, but consistent with post op status.    Stairs: Patient performed 4 stairs with Left handrail and SPC in R UE. Good sequencing and performance following education. CGA on stairs.   Balance: Deficits present on R LE, good support from FWW.   Sensory: NT  Coordination: NT    Physical Therapy Interventions: Balance, Gait Training , ROM and Strengthening    Clinical Impressions:  Criteria for Skilled Therapeutic Intervention Met: Yes, treatment indicated  PT Diagnosis: s/p R TKA   Influenced by the following impairments: pain, weakness, motion limitation, gait and balance deficits   Functional limitations due to impairment: decreased activity tolerance, decreased balance   Clinical presentation: Stable/Uncomplicated  Clinical presentation rationale: clinical judgement   Clinical Decision making (complexity): Low Complexity  Frequency: 2 times/day   Predicted Duration of Therapy Intervention (days/wks): 5 days  Anticipated Discharge Disposition: Home with Outpatient Therapy   Anticipated Equipment Needs at Discharge: none identified  Risks and Benefits of therapy have been explained: No  Patient, Family & other staff in agreement with plan of care: No  Clinical Impression Comments: Patient is doing well post R TKA. Is appropriate for return home from functional mobility status; however, post op pain will likely dictate and impact functional mobility and activity tolerance.     Total Eval Time: 22

## 2021-05-28 NOTE — PROGRESS NOTES
[facility]    Hospitalist Progress Note    Date of Service (when I saw the patient): 05/28/2021    Assessment & Plan   Mini Aguilar is a 61 year old female who was admitted on 5/27/2021.    Postoperative day #1 status post elective total right knee arthroplasty: Operative pain is somewhat uncontrolled this morning.  -Postoperative cares and protocol per orthopedics    Patient has no other significant chronic medical conditions requiring prescription medications.    DVT Prophylaxis: Defer to primary service    Code Status: Full Code    Disposition: Expected discharge in 1 to 2 days depending on postop course.    Del Barrios MD      Interval History   Patient seen at bedside.  Operative pain is somewhat uncontrolled this morning.  Patient denies chest pain, shortness of breath, abdominal pain.  Patient complained of slight nausea yesterday evening, but was able to consume dinner last night and breakfast this morning without difficulty.    -Data reviewed today: I reviewed all new labs and imaging results over the last 24 hours. I personally reviewed imaging reports.    Physical Exam   Temp: 99.1  F (37.3  C) Temp src: Tympanic BP: 129/72 Pulse: 76   Resp: 18 SpO2: 95 % O2 Device: None (Room air) Oxygen Delivery: 2 LPM  Vitals:    05/27/21 0718   Weight: 92.5 kg (204 lb)     Vital Signs with Ranges  Temp:  [96.9  F (36.1  C)-99.1  F (37.3  C)] 99.1  F (37.3  C)  Pulse:  [60-76] 76  Resp:  [12-20] 18  BP: (111-144)/() 129/72  SpO2:  [94 %-100 %] 95 %      Intake/Output Summary (Last 24 hours) at 5/28/2021 0858  Last data filed at 5/28/2021 0805  Gross per 24 hour   Intake 3027 ml   Output --   Net 3027 ml       Peripheral IV 05/27/21 Left Hand (Active)   Site Assessment WDL 05/28/21 0837   Line Status Saline locked 05/28/21 0837   Dressing Intervention New dressing  05/27/21 0821   Phlebitis Scale 0-->no symptoms 05/28/21 0837   Infiltration Scale 0 05/28/21 0837   Infiltration Site Treatment  Method  None 05/28/21 0140   If infiltrated, was a Vesicant infusing? No 05/27/21 0821   Number of days: 1       Incision/Surgical Site 05/27/21 Right Knee (Active)   Incision Assessment UTV 05/28/21 0002   Ciera-Incision Assessment [facility] 05/28/21 0002   Closure KELTON 05/28/21 0002   Incision Drainage Amount UTV 05/28/21 0002   Drainage Description [facility] 05/28/21 0002   Incision Care Ice applied 05/28/21 0002   Dressing Intervention Clean, dry, intact 05/28/21 0002   Number of days: 1     No line/device    Constitutional - AA, NAD  HEENT - atraumatic, normocephalic  Neck - supple, no masses, no JVD  CVS - S1 S2 RRR, no murmurs, rubs, gallops  Respiratory - CTA b/l  GI - soft, NT, ND, + bowel sounds, no organomegaly  Musculoskeletal - no LE edema, no lesions  Neuro - oriented x 3, no gross focal deficits    Medications     lactated ringers Stopped (05/28/21 0157)       acetaminophen  975 mg Oral Q8H     aspirin  81 mg Oral BID     docusate sodium  100 mg Oral BID     polyethylene glycol  17 g Oral Daily     senna-docusate  1 tablet Oral BID     sodium chloride (PF)  3 mL Intracatheter Q8H     traZODone  100 mg Oral At Bedtime     vitamin D3  25 mcg Oral Daily       Data   Recent Labs   Lab 05/28/21  0501   HGB 10.8*          Recent Results (from the past 24 hour(s))   XR Knee Port Right 1/2 Views    Narrative    XR KNEE PORT RIGHT 1/2 VIEWS    HISTORY: 61 years Female Post-Op Total Knee    COMPARISON: None    TECHNIQUE: 2 views right knee    FINDINGS: Patient status post right total knee arthroplasty. Skin  closure staples are present. Alignment is anatomic.      Impression    IMPRESSION: Right total knee arthroplasty without evidence of  complication.    MD Del ALLEN MD

## 2021-05-28 NOTE — PLAN OF CARE
Pt is A&O x 4. VS as charted, afebrile, PRN Oxycodone and dilaudid for pain. Dressing to right knee CDI. SBA w/gait belt and walker. Makes needs known, call light in reach.      Face to face report given with opportunity to observe patient.    Report given to SAPNA Healy RN   5/28/2021  3:44 PM

## 2021-05-28 NOTE — PROGRESS NOTES
JOSHUA BANERJEE  Patient visit during  rounds.  Mini is Zoroastrianism.  She anticipates discharge tomorrow. She has not spiritual care requests and notes that her family is close by.

## 2021-05-28 NOTE — PROGRESS NOTES
05/28/21 0900   Signing Clinician's Name / Credentials   Signing clinician's name / credentials Kristy Madsen, RAVINDER   Quick Adds   Rehab Discipline PT   Quick Adds Therapeutic Exercise;Therapeutic Activity   Therapeutic Activity   Minutes of Treatment 8 minutes   Symptoms Noted During/After Treatment Increased pain   Treatment Detail Assisted patient with bathroom to chair transfer at start of session CGA. And transfer back to bed with Min A for R LE. Patient resting in bed with ice cuff on, call button in reach, phone in reach. Patient pleased with her performance this sesison.    Therapeutic Exercise   Minutes of Treatment 15   Symptoms Noted During/After Treatment increased pain   Treatment Detail SLR 2 x 5. Initially assist needed, but with cues patient able to perform independently. SAQ x 15. Quad set 5 sec x 10, HS set 5 sec x 10. Ankle pumps. Seated heel slides 4 x 5 with cues to avoid valsava and for correct performance, LAQ 2 x 5.    PT Discharge Planning    PT Discharge Recommendation (DC Rec) home with outpatient physical therapy   PT Rationale for DC Rec Patient was able to perform gait 250 feet with FWW with CGA and stairs with CGA for physical assist and Min A in terms of cuing. Patient has daughter and spouse available to help at home. She has FWW and feels comfortable with return to home. Patient is appropriate for discharge home; however, post op pain could create functional mobility difficulty. Current physical status indicates that return home is appropriate, but post operative pain status will impact tolerance to activity and functional mobility.     PT Brief overview of current status  Able to perform independent SLR, gait 250 feet with FWW with step through gait pattern and good stability. CGA with gait. Stairs performance CGA for physical assist with use of one railing and SPC. Good stability and tolerance noted.    Additional Documentation   Rehab Comments Patient doing well. Flexion  slightly limited. 70-75 degrees flexion. 0 degrees extension.    PT Plan Continue PT services, until patient discharges.    Total Session Time   Total Session Time (minutes) 45 minutes

## 2021-05-28 NOTE — PLAN OF CARE
Received orders for OT evaluation, pt is independent and will be discharging, OT is not indicated at this time.

## 2021-05-29 VITALS
SYSTOLIC BLOOD PRESSURE: 135 MMHG | TEMPERATURE: 99.8 F | OXYGEN SATURATION: 94 % | RESPIRATION RATE: 18 BRPM | HEIGHT: 70 IN | DIASTOLIC BLOOD PRESSURE: 78 MMHG | WEIGHT: 204 LBS | BODY MASS INDEX: 29.2 KG/M2 | HEART RATE: 76 BPM

## 2021-05-29 LAB — HGB BLD-MCNC: 10.4 G/DL (ref 11.7–15.7)

## 2021-05-29 PROCEDURE — 85018 HEMOGLOBIN: CPT | Performed by: ORTHOPAEDIC SURGERY

## 2021-05-29 PROCEDURE — 36415 COLL VENOUS BLD VENIPUNCTURE: CPT | Performed by: ORTHOPAEDIC SURGERY

## 2021-05-29 PROCEDURE — 250N000013 HC RX MED GY IP 250 OP 250 PS 637: Performed by: ORTHOPAEDIC SURGERY

## 2021-05-29 RX ADMIN — DOCUSATE SODIUM AND SENNOSIDES 1 TABLET: 8.6; 5 TABLET, FILM COATED ORAL at 08:50

## 2021-05-29 RX ADMIN — Medication 25 MCG: at 08:49

## 2021-05-29 RX ADMIN — OXYCODONE HYDROCHLORIDE 15 MG: 5 TABLET ORAL at 08:49

## 2021-05-29 RX ADMIN — POLYETHYLENE GLYCOL 3350 17 G: 17 POWDER, FOR SOLUTION ORAL at 08:50

## 2021-05-29 RX ADMIN — OXYCODONE HYDROCHLORIDE 15 MG: 5 TABLET ORAL at 02:30

## 2021-05-29 RX ADMIN — ACETAMINOPHEN 975 MG: 325 TABLET, FILM COATED ORAL at 08:49

## 2021-05-29 RX ADMIN — ACETAMINOPHEN 975 MG: 325 TABLET, FILM COATED ORAL at 00:29

## 2021-05-29 RX ADMIN — ASPIRIN 81 MG: 81 TABLET, COATED ORAL at 08:49

## 2021-05-29 RX ADMIN — HYDROXYZINE HYDROCHLORIDE 25 MG: 25 TABLET, FILM COATED ORAL at 05:38

## 2021-05-29 RX ADMIN — DOCUSATE SODIUM 100 MG: 100 CAPSULE, LIQUID FILLED ORAL at 08:49

## 2021-05-29 ASSESSMENT — ACTIVITIES OF DAILY LIVING (ADL)
ADLS_ACUITY_SCORE: 17

## 2021-05-29 NOTE — PLAN OF CARE
Pt A&O x4, VSS on RA. C/o pain in rt knee, prn oxycodone and scheduled tylenol given with good relief. HRR and lungs are clear. BS active, denies any N&V. Rt knee dressing is intact, CMS good. Pt up with PT and did well. Discharging home today. Reviewed meds and discharge instructions with pt. All belongings sent home.

## 2021-05-29 NOTE — PLAN OF CARE
Patient vital signs are at baseline: Yes   Patient able to ambulate as they were prior to admission or with assist devices provided by therapies during their stay:  Yes; ambulated with assist devices per PT note. See note for more details  Patient MUST void prior to discharge:  Yes  Patient able to tolerate oral intake:  Yes  Pain has adequate pain control using Oral analgesics:  Yes; no IV pain medications given this shift. Pt has tolerated pain with scheduled tylenol and prn oxycodone and atarax.

## 2021-05-29 NOTE — DISCHARGE SUMMARY
[facility]    Discharge Summary  Hospitalist    Date of Admission:  5/27/2021  Date of Discharge:  5/29/2021  Discharging Provider: Del Barrios MD  Date of Service (when I saw the patient): 05/29/21    Discharge Diagnoses   Active Problems:    Right knee pain (5/27/2021)    Knee pain, acute (5/28/2021)  Status post elective right knee arthroplasty          History of Present Illness   Mini Aguilar is an 61 year old female who presented for elective total right knee arthroplasty.  Please see admission H+P for additional details.    Hospital Course   Mini Aguilar was admitted on 5/27/2021.  61-year-old female who presented for postop cares from elective total right knee arthroplasty.  No complications, patient did require extra day for additional pain control.  Tolerating physical therapy, stable for discharge.    Del Barrios MD      Significant Results and Procedures   See below    Pending Results   These results will be followed up by Amelia Varela    Unresulted Labs Ordered in the Past 30 Days of this Admission     No orders found from 4/27/2021 to 5/28/2021.          Code Status   Full Code       Primary Care Physician   AMELIA VARELA    Physical Exam   Temp: 99.1  F (37.3  C) Temp src: Tympanic BP: 128/72 Pulse: 71   Resp: 16 SpO2: 96 % O2 Device: None (Room air) Oxygen Delivery: 1 LPM(placed on 1L as sats dip 88-90% while sleeping)  Vitals:    05/27/21 0718   Weight: 92.5 kg (204 lb)     Vital Signs with Ranges  Temp:  [99.1  F (37.3  C)-100  F (37.8  C)] 99.1  F (37.3  C)  Pulse:  [71-79] 71  Resp:  [16-18] 16  BP: (113-133)/(65-76) 128/72  SpO2:  [90 %-96 %] 96 %  I/O last 3 completed shifts:  In: 650 [P.O.:650]  Out: -     Constitutional: AA, NAD  Eyes: PERRLA, no injection, no icterus  HEENT: atraumatic, normocephalic  Respiratory: CTA b/l  Cardiovascular: S1 S2 RRR  GI: soft, NT, ND, + bowel sounds  Lymph/Hematologic: no palpable lymphadenopathy  Skin: no rashes, no  "lesions  Musculoskeletal: No edema, good tone, no deformities  Neurologic: oriented x 3, no focal deficits  Psychiatric: appropriate affect    Discharge Disposition   Discharged to home  Condition at discharge: Stable    Consultations This Hospital Stay   HOSPITALIST IP CONSULT  SOCIAL WORK IP CONSULT  PHYSICAL THERAPY ADULT IP CONSULT  OCCUPATIONAL THERAPY ADULT IP CONSULT    Time Spent on this Encounter   Del ASCENCIO MD, personally saw the patient today and spent less than or equal to 30 minutes discharging this patient.    Discharge Orders      Reason for your hospital stay    Right total knee replacement     When to call - Contact Surgeon Team    You may experience symptoms that require follow-up before your scheduled appointment. Refer to the \"Stop[employer] Tool\" for instructions on when to contact your Surgeon Team if you are concerned about pain control, blood clots, constipation, or if you are unable to urinate.     When to call - Reach out to Urgent Care    If you are not able to reach your Surgeon Team and you need immediate care, go to the Orthopedic Walk-in Clinic or Urgent Care at your Surgeon's office.  Do NOT go to the Emergency Room unless you have shortness of breath, chest pain, or other signs of a medical emergency.     When to call - Reasons to Call 911    Call 911 immediately if you experience sudden-onset chest pain, arm weakness/numbness, slurred speech, or shortness of breath     Discharge Instruction - Breathing exercises    Perform breathing exercises using your Incentive Spirometer 10 times per hour while awake for 2 weeks.     Symptoms - Fever Management    A low grade fever can be expected after surgery.  Use acetaminophen (TYLENOL) as needed for fever management.  Contact your Surgeon Team if you have a fever greater than 101.5 F, chills, and/or night sweats.     Symptoms - Constipation management    Constipation (hard, dry bowel movements) is expected after surgery due to the " combination of being less active, the anesthetic, and the opioid pain medication.  You can do the following to help reduce constipation:  ~  FLUIDS:  Drink clear liquids (water or Gatorade), or juice (apple/prune).  ~  DIET:  Eat a fiber rich diet.    ~  ACTIVITY:  Get up and move around several times a day.  Increase your activity as you are able.  MEDICATIONS:  Reduce the risk of constipation by starting medications before you are constipated.  You can take Miralax   (1 packet as directed) and/or a stool softener (Senokot 1-2 tablets 1-2 times a day).  If you already have constipation and these medications are not working, you can get magnesium citrate and use as directed.  If you continue to have constipation you can try an over the counter suppository or enema.  Call your Surgeon Team if it has been greater than 3 days since your last bowel movement.     Symptoms - Reduced Urine Output    Changes in the amount of fluids you drank before and after surgery may result in problems urinating.  It is important to stay well-hydrated after surgery and drink plenty of water. If it has been greater than 8 hours since you have urinated despite drinking plenty of water, call your Surgeon Team.     Activity - Exercises to prevent blood clots    Unless otherwise directed by your Surgeon team, perform the following exercises at least three times per day for the first four weeks after surgery to prevent blood clots in your legs: 1) Point and flex your feet (Ankle Pumps), 2) Move your ankle around in big circles, 3) Wiggle your toes, 4) Walk, even for short distances, several times a day, will help decrease the risk of blood clots.     Comfort and Pain Management - Pain after Surgery    Pain after surgery is normal and expected.  You will have some amount of pain for several weeks after surgery.  Your pain will improve with time.  There are several things you can do to help reduce your pain including: rest, ice, elevation, and  using pain medications as needed. Contact your Surgeon Team if you have pain that persists or worsens after surgery despite rest, ice, elevation, and taking your medication(s) as prescribed. Contact your Surgeon Team if you have new numbness, tingling, or weakness in your operative extremity.     Comfort and Pain Management - Swelling after Surgery    Swelling and/or bruising of the surgical extremity is common and may persist for several months after surgery. In addition to frequent icing and elevation, gentle compressive support with an ACE wrap or tubigrip may help with swelling. Apply compression regularly, removing at least twice daily to perform skin checks. Contact your Surgeon Team if your swelling increases and is NOT associated with an increase in your activity level, or if your swelling increases and is associated with redness and pain.     Comfort and Pain Management - Cold therapy    Ice can be used to control swelling and discomfort after surgery. Place a thin towel over your operative site and apply the ice pack overtop. Leave ice pack in place for 20 minutes, then remove for 20 minutes. Repeat this 20 minutes on/20 minutes off routine as often as tolerated.     Medication Instructions - Acetaminophen (TYLENOL) Instructions    You were discharged with acetaminophen (TYLENOL) for pain management after surgery. Acetaminophen most effectively manages pain symptoms when it is taken on a schedule without missing doses (every four, six, or eight hours). Your Provider will prescribe a safe daily dose between 3000 - 4000 mg.  Do NOT exceed this daily dose. Most patients use acetaminophen for pain control for the first four weeks after surgery.  You can wean from this medication as your pain decreases.     Medication Instructions - NSAID Instructions    You were discharged with an anti-inflammatory medication for pain management to use in combination with acetaminophen (TYLENOL) and the narcotic pain  "medication.  Take this medication exactly as directed.  You should only take one anti-inflammatory at a time.  Some common anti-inflammatories include: ibuprofen (ADVIL, MOTRIN), naproxen (ALEVE, NAPROSYN), celecoxib (CELEBREX), meloxicam (MOBIC), ketorolac (TORADOL).  Take this medication with food and water.     Medication Instructions - Opioids - Tapering Instructions    In the first three days following surgery, your symptoms may warrant use of the narcotic pain medication every four to six hours as prescribed. This is normal. As your pain symptoms improve, focus your efforts on decreasing (tapering) use of narcotic medications. The most successful tapering strategy is to first, decrease the number of tablets you take every 4-6 hours to the minimum prescribed. Then, increase the amount of time between doses.  For example:  First, taper to   or 1 tablet every 4-6 hours.  Then, taper to   or 1 tablet every 6-8 hours.  Then, taper to   or 1 tablet every 8-10 hours.  Then, taper to   or 1 tablet every 10-12 hours.  Then, taper to   or 1 tablet at bedtime.  The bedtime dose can help with comfort during sleep and is typically the last dose to be discontinued after surgery.     Follow Up Care    Follow-up with your Surgeon Team in 2 weeks at [facility] with partner and me at [facility] 4-6 weeks     Medication instructions -  Anticoagulation - aspirin    Take the aspirin as prescribed for a total of four weeks after surgery.  This is given to help minimize your risk of blood clot.     Comfort and Pain Management - LOWER Extremity Elevation    Swelling is expected for several months after surgery. This type of swelling is usually associated with gravity and activity, and can be improved with elevation.   The best way to do this is to get your \"toes above your nose\" by laying down and placing several pillows lengthwise under your calf and heel. When elevating your leg keep your knee completely straight. " Perform this elevation as often as possible especially for the first two weeks after surgery.     Medication Instructions - Opioid Instructions (Less than 65 years)    You were discharged with an opioid medication (hydromorphone, oxycodone, hydrocodone, or tramadol). This medication should only be taken for breakthrough pain that is not controlled with acetaminophen (TYLENOL). If you rate your pain less than 3 you do not need this medication.  Pain rating 0-3:  You do not need this medication.  Pain rating 4-6:  Take 1 tablet every 4-6 hours as needed  Pain rating 7-10:  Take 2 tablets every 4-6 hours as needed.  Do not exceed 6 tablets per day     Activity - Total Knee Arthroplasty     Return to Driving    Return to driving - Driving is NOT permitted until directed by your provider. Under no circumstance are you permitted to drive while using narcotic pain medications.     Dressing / Wound Care - Wound    You have a clean dressing on your surgical wound. Dressing change instructions as follows: dressing will be removed at your follow-up appointment. Contact your Surgeon Team if you have increased redness, warmth around the surgical wound, and/or drainage from the surgical wound.     Dressing / Wound Care - NO Tub Bathing    Tub bathing, swimming, or any other activities that will cause your incision to be submerged in water should be avoided until allowed by your Surgeon.     NO Precautions    No precautions directed by your Provider.  You may perform range of motion activities as tolerated.     Weight bearing as tolerated    Weight bearing as tolerated on your operative extremity.     Dressing / Wound care - Shower with wound/dressing covered    You must COVER your dressing or incision with saran wrap (or any other non-permeable covering) to allow the incision to remain dry while showering.  You may shower 3 days after surgery as long as the surgical wound stays dry. Continue to cover your dressing or incision for  showering until your first office visit.  You are strictly prohibited from soaking   or submerging the surgical wound underwater.     Discharge Instruction - Regular Diet Adult    Return to your pre-surgery diet unless instructed otherwise     Discharge Medications   Current Discharge Medication List      START taking these medications    Details   acetaminophen (TYLENOL) 325 MG tablet Take 2 tablets (650 mg) by mouth every 4 hours as needed for other (mild pain)  Qty: 100 tablet, Refills: 0    Associated Diagnoses: Chronic pain of right knee      aspirin 81 MG EC tablet Take 1 tablet (81 mg) by mouth 2 times daily  Qty: 60 tablet, Refills: 0    Associated Diagnoses: Chronic pain of right knee      hydrOXYzine (ATARAX) 25 MG tablet Take 1 tablet (25 mg) by mouth every 6 hours as needed for itching or anxiety (with pain, moderate pain)  Qty: 30 tablet, Refills: 0    Associated Diagnoses: Chronic pain of right knee      oxyCODONE (ROXICODONE) 5 MG tablet Take 1-2 tablets (5-10 mg) by mouth every 3 hours as needed for pain (Moderate to Severe)  Qty: 30 tablet, Refills: 0    Associated Diagnoses: Chronic pain of right knee      senna-docusate (SENOKOT-S/PERICOLACE) 8.6-50 MG tablet Take 1-2 tablets by mouth 2 times daily Take while on oral narcotics to prevent or treat constipation.  Qty: 30 tablet, Refills: 0    Comments: While taking narcotics  Associated Diagnoses: Chronic pain of right knee         CONTINUE these medications which have NOT CHANGED    Details   calcium carbonate (OS-AMANDA 500 MG [place]. [place]) 500 MG tablet Take 1 tablet by mouth 2 times daily       cholecalciferol (VITAMIN D3) 1000 UNIT tablet Take 1,000 Units by mouth daily       docusate sodium (COLACE) 100 MG capsule Take 100 mg by mouth 2 times daily      Magnesium 500 MG CAPS Take 500 mg by mouth At Bedtime       multivitamin, therapeutic (THERA-VIT) TABS tablet Take 1 tablet by mouth 2 times daily      traZODone (DESYREL) 100 MG tablet Take 100 mg by  mouth At Bedtime       vitamin (B COMPLEX-C) tablet Take 1 tablet by mouth daily           Allergies   Allergies   Allergen Reactions     Seasonal Allergies      Hayfever     Vesicare [Solifenacin]      Data   Most Recent 3 CBC's:  Recent Labs   Lab Test 05/29/21  0608 05/28/21  0501   HGB 10.4* 10.8*      Most Recent 3 BMP's:No lab results found.  Most Recent 2 LFT's:No lab results found.  Most Recent INR's and Anticoagulation Dosing History:  Anticoagulation Dose History     There is no flowsheet data to display.        Most Recent 3 Troponin's:No lab results found.  Most Recent Cholesterol Panel:No lab results found.  Most Recent 6 Bacteria Isolates From Any Culture (See EPIC Reports for Culture Details):No lab results found.  Most Recent TSH, T4 and A1c Labs:No lab results found.  Results for orders placed or performed during the hospital encounter of 05/27/21   XR Knee Port Right 1/2 Views    Narrative    XR KNEE PORT RIGHT 1/2 VIEWS    HISTORY: 61 years Female Post-Op Total Knee    COMPARISON: None    TECHNIQUE: 2 views right knee    FINDINGS: Patient status post right total knee arthroplasty. Skin  closure staples are present. Alignment is anatomic.      Impression    IMPRESSION: Right total knee arthroplasty without evidence of  complication.    RANJANA JANSEN MD

## 2021-05-29 NOTE — PLAN OF CARE
Patient discharged at 10:36 AM via wheel chair accompanied by daughter and staff. Prescriptions sent to patients preferred pharmacy. All belongings sent with patient.     Discharge instructions reviewed with Mini. Listed belongings gathered and returned to patient. All belongings sent home    Patient discharged to Home.     Core Measures and Vaccines  Core Measures applicable during stay: No. If yes, state diagnosis: NA  Pneumonia and Influenza given prior to discharge, if indicated: N/A    Surgical Patient   Surgical Procedures during stay: Rt total knee  Did patient receive discharge instruction on wound care and recognition of infection symptoms? Yes    MISC  Follow up appointment made:  No  Home and hospital aquired medications returned to patient: Yes  Patient reports pain was well managed at discharge: Yes

## 2021-05-29 NOTE — PLAN OF CARE
"Reason for hospital stay:  Right Total Knee Arthroplasty POD#1    Most recent vitals: /76   Pulse 74   Temp 99.5  F (37.5  C) (Tympanic)   Resp 18   Ht 1.778 m (5' 10\")   Wt 92.5 kg (204 lb)   SpO2 96%   BMI 29.27 kg/m      Pain Management:  Complains of right surgical knee pain - gave scheduled Tylenol, PRN IV Dilaudid, PO oxy, and Atarax with moderate pain management. Also had cryo-cuff in place entire shift except when ambulating.       LOC:  A+O x4 - calls appropriately and makes needs known. Watching TV most of afternoon. Pleasant and talkative with staff.     Cardiac:  WDL    Respiratory:  Lungs clear throughout. Maintaining sats 96% on RA.     GI:  WDL    :  WDL    Skin Issues:  Right knee aquacel dressing remains clean, dry, and intact. CMS remains intact to RLE.     IVF:  SL    ABX:  Ancef completed.     Activity: Up with standby assist with walker and gait belt, ambulating to bathroom and in hallway. Up in chair for supper.       Face to face report given with opportunity to observe patient.    Report given to Rajni Gamez RN   5/28/2021  11:27 PM      "

## 2021-05-29 NOTE — PLAN OF CARE
Pt is A&O. R knee pain is 2-3/10 at rest and 6-7/10 after activity. Pt received oxycodone 15mg x1, atarax x1, and scheduled tylenol. Temp of 100.0 at start of shift, down to 99.1 with scheduled tylenol. No nausea or SOB. Up with SBA. IV SL. CMS intact. Pt placed on 1LPM supplemental O2 d/t sats 88-90% while sleeping. Pt is now awake on RA with sats 93-96%. Bed in lowest position, call light in reach, and pt makes needs known.     Face to face report given with opportunity to observe patient.    Report given to SAPNA Baeza RN   5/29/2021  7:38 AM

## 2021-06-02 DIAGNOSIS — M25.561 CHRONIC PAIN OF RIGHT KNEE: ICD-10-CM

## 2021-06-02 DIAGNOSIS — G89.29 CHRONIC PAIN OF RIGHT KNEE: ICD-10-CM

## 2021-06-04 RX ORDER — OXYCODONE HYDROCHLORIDE 5 MG/1
TABLET ORAL
Refills: 0
Start: 2021-06-04

## 2022-02-28 NOTE — PLAN OF CARE
"/81   Pulse 67   Temp 98.1  F (36.7  C) (Tympanic)   Resp 16   Ht 1.778 m (5' 10\")   Wt 92.5 kg (204 lb)   SpO2 97%   BMI 29.27 kg/m      Pt is A&O. Up with assist of 2 to bedside commode. Some numbness remains to RLE. Pain 6/10 after getting up to commode. Dilaudid and oxycodone administered at different intervals (see MAR). No nausea. Taking in oral fluids well. LR @ 75mL/hr. Voiding without difficulty, occasionally dribbles. Assessment as charted.  at bedside. Pt makes needs known. Call light in reach.   Face to face report given with opportunity to observe patient.    Report given to SAPNA Healy RN   5/27/2021  3:44 PM    " [employer] message sent to patient.     Thanks,  SYDNIE Conde  [facility]

## (undated) DEVICE — APPLICATOR-CHLORAPREP 26ML TINTED CHG 2%+ 70% IPA-SURGICAL

## (undated) DEVICE — IRRIGATION-NACL 1000ML

## (undated) DEVICE — CAUTERY PAD-POLYHESIVE II ADULT

## (undated) DEVICE — ICEMAN W/UNIVERSAL WRAP-ON PAD

## (undated) DEVICE — CEMENT MIXER-MIXEVAC III

## (undated) DEVICE — PACK-KNEE-CUSTOM

## (undated) DEVICE — BLADE-SAGITTAL 18MM X 90MM X 1.27MM

## (undated) DEVICE — SUTURE-VICRYL 2-0 CT-1 J945H

## (undated) DEVICE — Device

## (undated) DEVICE — BLADE-SAGITTAL DUAL CUT 25MM X 100MM X 1.27MM

## (undated) DEVICE — PACK-BASIN SET-UP

## (undated) DEVICE — SUTURE-VICRYL 1 CT-1 POP-OFF J741D

## (undated) DEVICE — DRSG-AQUACEL AG 3.5" X 10" SURGICAL

## (undated) DEVICE — IRRIGATION-H2O 1000ML

## (undated) DEVICE — LIGHT HANDLE COVER FOR SKYTRON LIGHTS

## (undated) DEVICE — GLV-8.0 PROTEXIS PI BLUE W/NEU-THERA LF/PF

## (undated) DEVICE — TOPICAL SKIN ADHESIVE EXOFIN

## (undated) DEVICE — IRRIGATION-NACL 3000ML (BAG)

## (undated) DEVICE — DRSG-AQUACEL AG 3.5" X 14" SURGICAL

## (undated) DEVICE — PULSE LAVAGE IRRIGATION SYSTEM

## (undated) DEVICE — CUFF-DISP STERILE 30IN SINGLE BLADDER

## (undated) DEVICE — FOOT PADS-TOTAL KNEE POSITIONER

## (undated) DEVICE — LIGHT HANDLE COVER

## (undated) DEVICE — SCD SLEEVE-KNEE REG.

## (undated) DEVICE — DRILL PIN HEADLESS TROCAR

## (undated) RX ORDER — PROPOFOL 10 MG/ML
INJECTION, EMULSION INTRAVENOUS
Status: DISPENSED
Start: 2021-05-27

## (undated) RX ORDER — ONDANSETRON 2 MG/ML
INJECTION INTRAMUSCULAR; INTRAVENOUS
Status: DISPENSED
Start: 2021-05-27

## (undated) RX ORDER — FENTANYL CITRATE 50 UG/ML
INJECTION, SOLUTION INTRAMUSCULAR; INTRAVENOUS
Status: DISPENSED
Start: 2021-05-27

## (undated) RX ORDER — LABETALOL 20 MG/4 ML (5 MG/ML) INTRAVENOUS SYRINGE
Status: DISPENSED
Start: 2021-05-27